# Patient Record
Sex: FEMALE | Race: OTHER | HISPANIC OR LATINO
[De-identification: names, ages, dates, MRNs, and addresses within clinical notes are randomized per-mention and may not be internally consistent; named-entity substitution may affect disease eponyms.]

---

## 2018-09-29 PROBLEM — Z00.00 ENCOUNTER FOR PREVENTIVE HEALTH EXAMINATION: Status: ACTIVE | Noted: 2018-09-29

## 2018-10-29 ENCOUNTER — APPOINTMENT (OUTPATIENT)
Dept: ENDOCRINOLOGY | Facility: CLINIC | Age: 48
End: 2018-10-29
Payer: MEDICAID

## 2018-10-29 VITALS
RESPIRATION RATE: 16 BRPM | HEART RATE: 70 BPM | HEIGHT: 63 IN | SYSTOLIC BLOOD PRESSURE: 115 MMHG | OXYGEN SATURATION: 98 % | DIASTOLIC BLOOD PRESSURE: 74 MMHG | WEIGHT: 138 LBS | BODY MASS INDEX: 24.45 KG/M2 | TEMPERATURE: 98.5 F

## 2018-10-29 DIAGNOSIS — Z86.39 PERSONAL HISTORY OF OTHER ENDOCRINE, NUTRITIONAL AND METABOLIC DISEASE: ICD-10-CM

## 2018-10-29 DIAGNOSIS — Z78.9 OTHER SPECIFIED HEALTH STATUS: ICD-10-CM

## 2018-10-29 DIAGNOSIS — Z87.39 PERSONAL HISTORY OF OTHER DISEASES OF THE MUSCULOSKELETAL SYSTEM AND CONNECTIVE TISSUE: ICD-10-CM

## 2018-10-29 DIAGNOSIS — Z87.898 PERSONAL HISTORY OF OTHER SPECIFIED CONDITIONS: ICD-10-CM

## 2018-10-29 PROCEDURE — 99204 OFFICE O/P NEW MOD 45 MIN: CPT

## 2019-04-03 ENCOUNTER — APPOINTMENT (OUTPATIENT)
Dept: ENDOCRINOLOGY | Facility: CLINIC | Age: 49
End: 2019-04-03
Payer: MEDICAID

## 2019-04-03 VITALS
WEIGHT: 140 LBS | HEART RATE: 83 BPM | OXYGEN SATURATION: 98 % | HEIGHT: 63 IN | SYSTOLIC BLOOD PRESSURE: 108 MMHG | BODY MASS INDEX: 24.8 KG/M2 | DIASTOLIC BLOOD PRESSURE: 69 MMHG | TEMPERATURE: 98.1 F | RESPIRATION RATE: 16 BRPM

## 2019-04-03 PROCEDURE — 99214 OFFICE O/P EST MOD 30 MIN: CPT

## 2019-04-03 NOTE — PHYSICAL EXAM
[Alert] : alert [No Acute Distress] : no acute distress [Normal Sclera/Conjunctiva] : normal sclera/conjunctiva [PERRL] : pupils equal, round and reactive to light [Normal Outer Ear/Nose] : the ears and nose were normal in appearance [Normal TMs] : both tympanic membranes were normal [No Neck Mass] : no neck mass was observed [Supple] : the neck was supple [No Respiratory Distress] : no respiratory distress [Normal Rate and Effort] : normal respiratory rhythm and effort [Normal PMI] : the apical impulse was normal [Normal Rate] : heart rate was normal  [Carotids Normal] : carotid pulses were normal with no bruits [Pedal Pulses Normal] : the pedal pulses are present [No Motor Deficits] : the motor exam was normal [No Sensory Deficits] : the sensory exam was normal to light touch and pinprick [Normal Sensation on Monofilament Testing] : normal sensation on monofilament testing of lower extremities

## 2019-04-03 NOTE — HISTORY OF PRESENT ILLNESS
[FreeTextEntry1] : Patient has a pituitary microadenoma since 2013, the lesion has not changed significantly for the past 2 years. The endocrinology work up was negative. Denies any headaches, her visual fields were fine. Denies galactorrhea. no change in the size of her hands or feet. She also sees the Neurologist Dr. Diana.

## 2019-04-03 NOTE — ASSESSMENT
[FreeTextEntry1] : The patient has an stable pituitary microadenoma\par The endocrine work up was negative\par Will continue observation\par Will repeat the endocrine work up in 4 months\par Will repeat the MRI of the pituitary gland at the end of the year

## 2019-07-21 ENCOUNTER — EMERGENCY (EMERGENCY)
Facility: HOSPITAL | Age: 49
LOS: 1 days | Discharge: HOME | End: 2019-07-21
Attending: EMERGENCY MEDICINE
Payer: MEDICAID

## 2019-07-21 VITALS
DIASTOLIC BLOOD PRESSURE: 70 MMHG | WEIGHT: 139.99 LBS | HEART RATE: 100 BPM | HEIGHT: 63 IN | SYSTOLIC BLOOD PRESSURE: 129 MMHG | TEMPERATURE: 98 F | OXYGEN SATURATION: 96 % | RESPIRATION RATE: 16 BRPM

## 2019-07-21 VITALS
OXYGEN SATURATION: 98 % | HEART RATE: 82 BPM | SYSTOLIC BLOOD PRESSURE: 123 MMHG | RESPIRATION RATE: 16 BRPM | TEMPERATURE: 98 F | DIASTOLIC BLOOD PRESSURE: 84 MMHG

## 2019-07-21 LAB
ALBUMIN SERPL ELPH-MCNC: 3.8 G/DL — SIGNIFICANT CHANGE UP (ref 3.5–5)
ALP SERPL-CCNC: 94 U/L — SIGNIFICANT CHANGE UP (ref 40–120)
ALT FLD-CCNC: 27 U/L DA — SIGNIFICANT CHANGE UP (ref 10–60)
ANION GAP SERPL CALC-SCNC: 8 MMOL/L — SIGNIFICANT CHANGE UP (ref 5–17)
AST SERPL-CCNC: 22 U/L — SIGNIFICANT CHANGE UP (ref 10–40)
BILIRUB SERPL-MCNC: 0.2 MG/DL — SIGNIFICANT CHANGE UP (ref 0.2–1.2)
BUN SERPL-MCNC: 10 MG/DL — SIGNIFICANT CHANGE UP (ref 7–18)
CALCIUM SERPL-MCNC: 8.4 MG/DL — SIGNIFICANT CHANGE UP (ref 8.4–10.5)
CHLORIDE SERPL-SCNC: 106 MMOL/L — SIGNIFICANT CHANGE UP (ref 96–108)
CK SERPL-CCNC: 156 U/L — SIGNIFICANT CHANGE UP (ref 21–215)
CO2 SERPL-SCNC: 26 MMOL/L — SIGNIFICANT CHANGE UP (ref 22–31)
CREAT SERPL-MCNC: 0.7 MG/DL — SIGNIFICANT CHANGE UP (ref 0.5–1.3)
GLUCOSE SERPL-MCNC: 144 MG/DL — HIGH (ref 70–99)
HCG SERPL-ACNC: 2 MIU/ML — SIGNIFICANT CHANGE UP
HCT VFR BLD CALC: 36.7 % — SIGNIFICANT CHANGE UP (ref 34.5–45)
HGB BLD-MCNC: 12.3 G/DL — SIGNIFICANT CHANGE UP (ref 11.5–15.5)
MCHC RBC-ENTMCNC: 31.6 PG — SIGNIFICANT CHANGE UP (ref 27–34)
MCHC RBC-ENTMCNC: 33.5 GM/DL — SIGNIFICANT CHANGE UP (ref 32–36)
MCV RBC AUTO: 94.3 FL — SIGNIFICANT CHANGE UP (ref 80–100)
NRBC # BLD: 0 /100 WBCS — SIGNIFICANT CHANGE UP (ref 0–0)
PLATELET # BLD AUTO: 267 K/UL — SIGNIFICANT CHANGE UP (ref 150–400)
POTASSIUM SERPL-MCNC: 3.7 MMOL/L — SIGNIFICANT CHANGE UP (ref 3.5–5.3)
POTASSIUM SERPL-SCNC: 3.7 MMOL/L — SIGNIFICANT CHANGE UP (ref 3.5–5.3)
PROT SERPL-MCNC: 8 G/DL — SIGNIFICANT CHANGE UP (ref 6–8.3)
RBC # BLD: 3.89 M/UL — SIGNIFICANT CHANGE UP (ref 3.8–5.2)
RBC # FLD: 12.8 % — SIGNIFICANT CHANGE UP (ref 10.3–14.5)
SODIUM SERPL-SCNC: 140 MMOL/L — SIGNIFICANT CHANGE UP (ref 135–145)
TROPONIN I SERPL-MCNC: <0.015 NG/ML — SIGNIFICANT CHANGE UP (ref 0–0.04)
TROPONIN I SERPL-MCNC: <0.015 NG/ML — SIGNIFICANT CHANGE UP (ref 0–0.04)
WBC # BLD: 7.43 K/UL — SIGNIFICANT CHANGE UP (ref 3.8–10.5)
WBC # FLD AUTO: 7.43 K/UL — SIGNIFICANT CHANGE UP (ref 3.8–10.5)

## 2019-07-21 PROCEDURE — 80053 COMPREHEN METABOLIC PANEL: CPT

## 2019-07-21 PROCEDURE — 85027 COMPLETE CBC AUTOMATED: CPT

## 2019-07-21 PROCEDURE — 71045 X-RAY EXAM CHEST 1 VIEW: CPT | Mod: 26

## 2019-07-21 PROCEDURE — 36415 COLL VENOUS BLD VENIPUNCTURE: CPT

## 2019-07-21 PROCEDURE — 99284 EMERGENCY DEPT VISIT MOD MDM: CPT | Mod: 25

## 2019-07-21 PROCEDURE — 99284 EMERGENCY DEPT VISIT MOD MDM: CPT

## 2019-07-21 PROCEDURE — 93005 ELECTROCARDIOGRAM TRACING: CPT

## 2019-07-21 PROCEDURE — 84484 ASSAY OF TROPONIN QUANT: CPT

## 2019-07-21 PROCEDURE — 82550 ASSAY OF CK (CPK): CPT

## 2019-07-21 PROCEDURE — 71045 X-RAY EXAM CHEST 1 VIEW: CPT

## 2019-07-21 PROCEDURE — 84702 CHORIONIC GONADOTROPIN TEST: CPT

## 2019-07-21 RX ORDER — SODIUM CHLORIDE 9 MG/ML
1000 INJECTION INTRAMUSCULAR; INTRAVENOUS; SUBCUTANEOUS ONCE
Refills: 0 | Status: COMPLETED | OUTPATIENT
Start: 2019-07-21 | End: 2019-07-21

## 2019-07-21 RX ADMIN — SODIUM CHLORIDE 1000 MILLILITER(S): 9 INJECTION INTRAMUSCULAR; INTRAVENOUS; SUBCUTANEOUS at 21:45

## 2019-07-22 NOTE — ED PROVIDER NOTE - PROGRESS NOTE DETAILS
Pt feels much improved.  Troponin negative x 2, EKG normal, no serious pathology suspected.  Advised strict return precautions and PMD f/u.

## 2019-07-22 NOTE — ED PROVIDER NOTE - OBJECTIVE STATEMENT
50 y/o female with no significant PMHx presents to the ED with c/o left sided chest discomfort and transient SOB since 6PM after being exposed to very hot weather. Pt denies any fever, cough, leg pain/swelling, nausea, vomiting, dizziness, syncope, Hx of DVT/PE, recent travels, or other complaints. Sx have now almost completely resolved.

## 2019-07-22 NOTE — ED PROVIDER NOTE - CLINICAL SUMMARY MEDICAL DECISION MAKING FREE TEXT BOX
50 y/o female presents with chest discomfort and mild SOB gradually improving after heat exposure. Will check labs, EKG, CXR, and reassess.

## 2019-07-24 ENCOUNTER — APPOINTMENT (OUTPATIENT)
Dept: CARDIOLOGY | Facility: CLINIC | Age: 49
End: 2019-07-24

## 2019-07-29 ENCOUNTER — APPOINTMENT (OUTPATIENT)
Dept: ENDOCRINOLOGY | Facility: CLINIC | Age: 49
End: 2019-07-29
Payer: MEDICAID

## 2019-07-29 VITALS
HEART RATE: 77 BPM | HEIGHT: 63 IN | DIASTOLIC BLOOD PRESSURE: 69 MMHG | RESPIRATION RATE: 16 BRPM | SYSTOLIC BLOOD PRESSURE: 117 MMHG | TEMPERATURE: 98.2 F | WEIGHT: 137 LBS | BODY MASS INDEX: 24.27 KG/M2 | OXYGEN SATURATION: 99 %

## 2019-07-29 PROCEDURE — 99214 OFFICE O/P EST MOD 30 MIN: CPT

## 2019-07-29 RX ORDER — DEXAMETHASONE 1 MG/1
1 TABLET ORAL
Qty: 1 | Refills: 1 | Status: COMPLETED | COMMUNITY
Start: 2019-04-03 | End: 2019-07-29

## 2019-07-29 NOTE — DATA REVIEWED
[FreeTextEntry1] : The overnight dexamethasone test was negative. The Prolactin was normal. The FBS was borderline elevated.

## 2019-07-29 NOTE — ASSESSMENT
[FreeTextEntry1] : Patient is clinically euthyroid\par The Overnight dexamethasone test was negative\par The MRI of the pituitary gland was done 11/19 and it was unchanged\par Will continue observation

## 2019-07-29 NOTE — HISTORY OF PRESENT ILLNESS
[FreeTextEntry1] : The patient is doing well, she has a solid/cystic lesion in the pituitary gland, unchanged during the past 4 years. The hormonal work up has been negative. The overnight dexamethasone test was negative. She is postmenopausal.

## 2019-07-29 NOTE — PHYSICAL EXAM
[Alert] : alert [No Acute Distress] : no acute distress [Normal Sclera/Conjunctiva] : normal sclera/conjunctiva [PERRL] : pupils equal, round and reactive to light [Normal Outer Ear/Nose] : the ears and nose were normal in appearance [Normal TMs] : both tympanic membranes were normal [No Respiratory Distress] : no respiratory distress [Supple] : the neck was supple [No Neck Mass] : no neck mass was observed [Normal Rate and Effort] : normal respiratory rhythm and effort [Normal PMI] : the apical impulse was normal [Normal Rate] : heart rate was normal  [Carotids Normal] : carotid pulses were normal with no bruits [No Motor Deficits] : the motor exam was normal [Pedal Pulses Normal] : the pedal pulses are present [No Sensory Deficits] : the sensory exam was normal to light touch and pinprick [Normal Sensation on Monofilament Testing] : normal sensation on monofilament testing of lower extremities

## 2019-07-30 PROBLEM — Z78.9 OTHER SPECIFIED HEALTH STATUS: Chronic | Status: ACTIVE | Noted: 2019-07-24

## 2019-11-18 ENCOUNTER — APPOINTMENT (OUTPATIENT)
Dept: ENDOCRINOLOGY | Facility: CLINIC | Age: 49
End: 2019-11-18
Payer: MEDICAID

## 2019-11-18 VITALS
WEIGHT: 132 LBS | OXYGEN SATURATION: 99 % | DIASTOLIC BLOOD PRESSURE: 79 MMHG | RESPIRATION RATE: 16 BRPM | HEART RATE: 84 BPM | TEMPERATURE: 98.4 F | SYSTOLIC BLOOD PRESSURE: 128 MMHG | BODY MASS INDEX: 23.39 KG/M2 | HEIGHT: 63 IN

## 2019-11-18 PROCEDURE — 99214 OFFICE O/P EST MOD 30 MIN: CPT

## 2019-11-18 NOTE — HISTORY OF PRESENT ILLNESS
[FreeTextEntry1] : The patient has a 9 mm pituitary microadenoma, the endocrine work up was fine. The FBS was normal with borderline elevated HbA1c, She saw the neurologist, she is ordering a new MRI of the brain.

## 2019-11-18 NOTE — DATA REVIEWED
[FreeTextEntry1] : The TSH and Free t4 were normal. The Prolactin was normal. The FBS was fine with borderline elevated  HbA1c

## 2019-11-18 NOTE — PHYSICAL EXAM
[Alert] : alert [No Acute Distress] : no acute distress [Normal Sclera/Conjunctiva] : normal sclera/conjunctiva [PERRL] : pupils equal, round and reactive to light [Normal TMs] : both tympanic membranes were normal [Normal Outer Ear/Nose] : the ears and nose were normal in appearance [No Neck Mass] : no neck mass was observed [No Respiratory Distress] : no respiratory distress [Supple] : the neck was supple [Normal Rate] : heart rate was normal  [Normal Rate and Effort] : normal respiratory rhythm and effort [Normal PMI] : the apical impulse was normal [Pedal Pulses Normal] : the pedal pulses are present [Carotids Normal] : carotid pulses were normal with no bruits [No Motor Deficits] : the motor exam was normal [No Sensory Deficits] : the sensory exam was normal to light touch and pinprick [Normal Sensation on Monofilament Testing] : normal sensation on monofilament testing of lower extremities

## 2019-11-18 NOTE — ASSESSMENT
[FreeTextEntry1] : The endocrine work up is normal\par The prediabetes is stable\par Will repeat the MRI of the brain\par She is on Vitamin D3\par Patient has Osteopenia on Alendronate, she does not remember well how many years she is taking the medication but it is about 3 to 4 years\par She will continue the medication until next year.

## 2020-04-22 ENCOUNTER — APPOINTMENT (OUTPATIENT)
Dept: CARDIOLOGY | Facility: CLINIC | Age: 50
End: 2020-04-22

## 2020-05-21 RX ORDER — GABAPENTIN 100 MG/1
100 CAPSULE ORAL
Refills: 0 | Status: ACTIVE | COMMUNITY

## 2020-05-21 RX ORDER — GLUCOSAMINE SULFATE DIPOT CHLR 1000 MG
TABLET ORAL
Refills: 0 | Status: ACTIVE | COMMUNITY

## 2020-05-21 RX ORDER — UBIDECARENONE/VIT E ACET 100MG-5
CAPSULE ORAL
Refills: 0 | Status: ACTIVE | COMMUNITY

## 2020-05-21 RX ORDER — TERBINAFINE HYDROCHLORIDE 250 MG/1
250 TABLET ORAL
Refills: 0 | Status: ACTIVE | COMMUNITY

## 2020-06-03 ENCOUNTER — APPOINTMENT (OUTPATIENT)
Dept: CARDIOLOGY | Facility: CLINIC | Age: 50
End: 2020-06-03
Payer: MEDICAID

## 2020-06-03 VITALS
DIASTOLIC BLOOD PRESSURE: 77 MMHG | OXYGEN SATURATION: 98 % | SYSTOLIC BLOOD PRESSURE: 116 MMHG | BODY MASS INDEX: 21.62 KG/M2 | HEART RATE: 79 BPM | WEIGHT: 122 LBS | HEIGHT: 63 IN

## 2020-06-03 DIAGNOSIS — R07.89 OTHER CHEST PAIN: ICD-10-CM

## 2020-06-03 PROCEDURE — 99204 OFFICE O/P NEW MOD 45 MIN: CPT

## 2020-06-03 PROCEDURE — 93000 ELECTROCARDIOGRAM COMPLETE: CPT

## 2020-06-10 ENCOUNTER — APPOINTMENT (OUTPATIENT)
Dept: NEUROLOGY | Facility: CLINIC | Age: 50
End: 2020-06-10

## 2020-06-10 ENCOUNTER — APPOINTMENT (OUTPATIENT)
Dept: ENDOCRINOLOGY | Facility: CLINIC | Age: 50
End: 2020-06-10
Payer: MEDICAID

## 2020-06-10 ENCOUNTER — APPOINTMENT (OUTPATIENT)
Dept: ENDOCRINOLOGY | Facility: CLINIC | Age: 50
End: 2020-06-10

## 2020-06-10 PROCEDURE — 99443: CPT

## 2020-06-10 NOTE — REASON FOR VISIT
[Follow - Up] : a follow-up visit [Pituitary Evaluation/ Disorder] : pituitary evaluation/disorder [Other___] : [unfilled]

## 2020-06-10 NOTE — HISTORY OF PRESENT ILLNESS
[Home] : at home, [unfilled] , at the time of the visit. [Medical Office: ([facility])___] : at the medical office located in  [Verbal consent obtained from patient] : the patient, [unfilled] [FreeTextEntry1] : Patient is not feeling well, she is feeling very nervous, anxious. She has lost weight. She has been home for 3 months. She is starting to go out. Her blood work up revealed elevated FBS and LDL-C. her TSH, Prolactin and IGF1 were normal. He has an appointment with her PCP, she wants to see a Psychologist. The MRI of the brain revealed smaller pituitary lesion.

## 2020-06-10 NOTE — DATA REVIEWED
[FreeTextEntry1] : The FBS was elevated, the LDL-C was elevated. The prolactin, IGF-1 and TSH were normal.

## 2020-06-10 NOTE — ASSESSMENT
[FreeTextEntry1] : The patient has Prediabetes\par She is clinically euthyroid\par The pituitary microadenoma is non functional\par Her hyperlipidemia is not well controlled\par Mailed a low CHO, low fat diet\par Will repeat the blood tests in 4 months

## 2020-08-11 ENCOUNTER — OUTPATIENT (OUTPATIENT)
Dept: OUTPATIENT SERVICES | Facility: HOSPITAL | Age: 50
LOS: 1 days | End: 2020-08-11
Payer: MEDICAID

## 2020-08-11 DIAGNOSIS — R07.81 PLEURODYNIA: ICD-10-CM

## 2020-08-11 PROCEDURE — 93350 STRESS TTE ONLY: CPT | Mod: 26

## 2020-08-11 PROCEDURE — 93325 DOPPLER ECHO COLOR FLOW MAPG: CPT

## 2020-08-11 PROCEDURE — 93320 DOPPLER ECHO COMPLETE: CPT

## 2020-08-25 NOTE — ADDENDUM
[FreeTextEntry1] : ADDENDUM 8/25: Stress echo from 08/11/2020 was negative for ischemia (11 METS achieved). D/W patient upon completion of study.

## 2020-08-25 NOTE — HISTORY OF PRESENT ILLNESS
[FreeTextEntry1] : 50-year-old female with hyperlipidemia and pituitary microadenoma who presents for evaluation of chest pain. Patient reports the chest pain is left-sided, pressure-like in nature, non-radiating, lasting for up to an hour at a time. Symptoms are associated with occasional dyspnea, lightheadedness or palpitations. No syncope. Symptoms occur every 2-3 days on average, are not always linked with activity. Patient has not had any symptoms for the past week. Denies lower extremity edema, orthopnea, or PND.

## 2020-08-25 NOTE — ASSESSMENT
[FreeTextEntry1] : 50-year-old female with hyperlipidemia and pituitary microadenoma who presents for evaluation of chest pain. \par \par 1. Chest pain: While patient's presentation has some typical symptoms, other aspects seem atypical and the patient has no traditional risk factors including smoking, diabetes, HTN, or family history of CAD.\par -Given her younger age and desire to minimize radiation exposure, would send patient for a stress echocardiogram for further ischemic evaluation\par \par 2.HLD: On atorvastatin, LDL is suboptimal. If repeat blood work is not significantly changed, would recommend increasing dose of atorvastatin

## 2020-09-02 ENCOUNTER — APPOINTMENT (OUTPATIENT)
Dept: CARDIOLOGY | Facility: CLINIC | Age: 50
End: 2020-09-02

## 2020-10-12 ENCOUNTER — APPOINTMENT (OUTPATIENT)
Dept: ENDOCRINOLOGY | Facility: CLINIC | Age: 50
End: 2020-10-12
Payer: MEDICAID

## 2020-10-12 VITALS
DIASTOLIC BLOOD PRESSURE: 70 MMHG | BODY MASS INDEX: 21.79 KG/M2 | OXYGEN SATURATION: 100 % | WEIGHT: 123 LBS | HEIGHT: 63 IN | RESPIRATION RATE: 16 BRPM | HEART RATE: 80 BPM | TEMPERATURE: 97.3 F | SYSTOLIC BLOOD PRESSURE: 103 MMHG

## 2020-10-12 PROCEDURE — 99214 OFFICE O/P EST MOD 30 MIN: CPT

## 2020-10-12 NOTE — HISTORY OF PRESENT ILLNESS
[FreeTextEntry1] : The patient is doing well, denies any headaches or dizziness or eye problems. She has Osteopenia, she used to take Alendronate for 5 years. The MRI of the brain 5/20 was stable in size.

## 2020-10-12 NOTE — ASSESSMENT
[FreeTextEntry1] : The pituitary adenoma is stable\par The hormonal study was fine\par The LDL-C is elevated\par Advised to take the Simvastatin regularly.

## 2020-10-12 NOTE — DATA REVIEWED
[FreeTextEntry1] : The blood work up was negative except for the LDL-C that is elevated. She has not been taking the statin. The Prolactin was normal with elevated gonadotropins.

## 2021-03-24 ENCOUNTER — APPOINTMENT (OUTPATIENT)
Dept: ENDOCRINOLOGY | Facility: CLINIC | Age: 51
End: 2021-03-24
Payer: MEDICAID

## 2021-03-24 VITALS
TEMPERATURE: 98 F | DIASTOLIC BLOOD PRESSURE: 72 MMHG | SYSTOLIC BLOOD PRESSURE: 108 MMHG | WEIGHT: 134 LBS | BODY MASS INDEX: 23.74 KG/M2 | HEIGHT: 63 IN | OXYGEN SATURATION: 99 % | HEART RATE: 82 BPM | RESPIRATION RATE: 16 BRPM

## 2021-03-24 DIAGNOSIS — M85.80 OTHER SPECIFIED DISORDERS OF BONE DENSITY AND STRUCTURE, UNSPECIFIED SITE: ICD-10-CM

## 2021-03-24 PROCEDURE — 99072 ADDL SUPL MATRL&STAF TM PHE: CPT

## 2021-03-24 PROCEDURE — 99214 OFFICE O/P EST MOD 30 MIN: CPT

## 2021-03-24 NOTE — ASSESSMENT
[FreeTextEntry1] : Patient feels well but she has gained weight\par She is now slightly hypothyroid\par Will repeat the thyroid tests before starting treatment\par Patient will call me back for results\par Will repeat the MRI of the pituitary gland on 5/21\par Will order an US thyroid

## 2021-03-24 NOTE — HISTORY OF PRESENT ILLNESS
[FreeTextEntry1] : Patient feels well, but she has not been following the diet and gaining weight. Her TSH is now slightly elevated. She denies cold intolerance or dryness of the skin. The Prolactin and IGF-1 were normal.

## 2021-03-24 NOTE — REASON FOR VISIT
[Follow - Up] : a follow-up visit [Hypothyroidism] : hypothyroidism [Pituitary Evaluation/ Disorder] : pituitary evaluation/disorder [Other___] : [unfilled]

## 2021-05-01 ENCOUNTER — NON-APPOINTMENT (OUTPATIENT)
Age: 51
End: 2021-05-01

## 2021-05-06 ENCOUNTER — NON-APPOINTMENT (OUTPATIENT)
Age: 51
End: 2021-05-06

## 2021-07-16 ENCOUNTER — APPOINTMENT (OUTPATIENT)
Dept: ENDOCRINOLOGY | Facility: CLINIC | Age: 51
End: 2021-07-16

## 2021-10-21 ENCOUNTER — APPOINTMENT (OUTPATIENT)
Dept: ENDOCRINOLOGY | Facility: CLINIC | Age: 51
End: 2021-10-21
Payer: MEDICAID

## 2021-10-21 PROCEDURE — 99443: CPT

## 2021-10-21 NOTE — HISTORY OF PRESENT ILLNESS
[Home] : at home, [unfilled] , at the time of the visit. [Medical Office: ([facility])___] : at the medical office located in  [Verbal consent obtained from patient] : the patient, [unfilled] [FreeTextEntry1] : Patient is doing well, denies feeling tired, having cold intolerance, or palpitations. Denies dryness of the skin or hair loss. She denies chest pain or SOB. Taking the Levothyroxine regularly ½ hour before breakfast. Her weight has increased.  The thyroid tests are within normal limits. The US thyroid was unchanged 5/21.\par

## 2021-10-21 NOTE — ASSESSMENT
[FreeTextEntry1] : Patient is clinically euthyroid\par Her weight has increased\par Will continue the same thyroid medication\par Will repeat the thyroid function tests before next visit\par The neurologist will order the MRI of the brain\par The Prolactin level is normal.\par The Prediabetes has deteriorated\par Advised to follow the diet and exercise\par The medications were renewed

## 2021-10-21 NOTE — DATA REVIEWED
[FreeTextEntry1] : The TSH and Free t4 are normal. The HbA1c has increased. The LDL-c has increased. Her Prolactin level and IGF-1 were normal.

## 2022-03-29 ENCOUNTER — APPOINTMENT (OUTPATIENT)
Dept: ENDOCRINOLOGY | Facility: CLINIC | Age: 52
End: 2022-03-29
Payer: MEDICAID

## 2022-03-29 VITALS
HEART RATE: 72 BPM | TEMPERATURE: 98.3 F | BODY MASS INDEX: 26.05 KG/M2 | HEIGHT: 63 IN | SYSTOLIC BLOOD PRESSURE: 113 MMHG | OXYGEN SATURATION: 97 % | DIASTOLIC BLOOD PRESSURE: 75 MMHG | WEIGHT: 147 LBS | RESPIRATION RATE: 16 BRPM

## 2022-03-29 PROCEDURE — 99214 OFFICE O/P EST MOD 30 MIN: CPT

## 2022-03-29 NOTE — ASSESSMENT
[FreeTextEntry1] : Patient is clinically euthyroid\par The endocrine work up for the pituitary gland was fine\par Will continue same treatment\par Advised to see Neurologist\par Advised to get an MRI of the pituitary gland with contrast\par The Prediabetes has deteriorated slightly as well as the hyperlipidemia\par Given a low CHO, low fat diet\par Advised to exercise regularly

## 2022-03-29 NOTE — DATA REVIEWED
[FreeTextEntry1] : The TSH and free t4 are normal. The FBS and hbA1c are slightly higher. The prolactin and IGF-1 are normal. The FSH and LH are elevated.

## 2022-03-29 NOTE — HISTORY OF PRESENT ILLNESS
[FreeTextEntry1] : Patient is doing well, denies feeling tired, having cold intolerance, or palpitations. Denies dryness of the skin or hair loss. She denies chest pain or SOB. Taking the Levothyroxine regularly ½ hour before breakfast. Her weight has increased. The thyroid tests are within normal limits. The US thyroid is unchanged. \par

## 2022-05-10 ENCOUNTER — APPOINTMENT (OUTPATIENT)
Dept: CARDIOLOGY | Facility: CLINIC | Age: 52
End: 2022-05-10
Payer: MEDICAID

## 2022-05-10 ENCOUNTER — NON-APPOINTMENT (OUTPATIENT)
Age: 52
End: 2022-05-10

## 2022-05-10 VITALS
HEART RATE: 87 BPM | BODY MASS INDEX: 24.8 KG/M2 | SYSTOLIC BLOOD PRESSURE: 114 MMHG | DIASTOLIC BLOOD PRESSURE: 72 MMHG | TEMPERATURE: 98 F | WEIGHT: 140 LBS | HEIGHT: 63 IN | OXYGEN SATURATION: 100 %

## 2022-05-10 DIAGNOSIS — E78.5 HYPERLIPIDEMIA, UNSPECIFIED: ICD-10-CM

## 2022-05-10 PROCEDURE — 93000 ELECTROCARDIOGRAM COMPLETE: CPT

## 2022-05-10 PROCEDURE — 99214 OFFICE O/P EST MOD 30 MIN: CPT | Mod: 25

## 2022-05-10 NOTE — HISTORY OF PRESENT ILLNESS
[FreeTextEntry1] : 52-year-old female with hyperlipidemia and pituitary microadenoma who presents for follow-up visit.  Patient had a nuclear stress test in 08/2020 showing no ischemia after 11 METS, echocardiogram showed preserved EF.\par \par Patient was last seen in 2020.  Since that time, she reports that she has been doing fairly well with some palpitations that started about 3 weeks ago, lasting a minute at a time, not associated with any chest pain, dyspnea, lightheadedness, or syncope.  Patient reports that the symptoms have nearly resolved at this point. Denies LE edema, orthopnea, or PND. Patient reports compliance with all medications, denies adverse effects.\par \par \par

## 2022-05-10 NOTE — ASSESSMENT
[FreeTextEntry1] : 52-year-old female with hyperlipidemia and unremarkable nuclear stress/echo in 2020 who presents for FUV. \par \par 1. Palpitations: Symptoms appear mild, and are not significant enough that patient will be interested in starting medication or performing any procedure.  If symptoms worsen, she will let me know and we can perform further work-up as needed.\par \par 2.HLD: On atorvastatin, LDL is adequate\par -Patient was counseled on lifestyle modifications including recommendations for diet and exercise\par

## 2022-08-18 ENCOUNTER — APPOINTMENT (OUTPATIENT)
Dept: ENDOCRINOLOGY | Facility: CLINIC | Age: 52
End: 2022-08-18

## 2022-08-18 VITALS
TEMPERATURE: 97.7 F | HEIGHT: 63 IN | BODY MASS INDEX: 25.16 KG/M2 | HEART RATE: 72 BPM | WEIGHT: 142 LBS | SYSTOLIC BLOOD PRESSURE: 131 MMHG | RESPIRATION RATE: 16 BRPM | DIASTOLIC BLOOD PRESSURE: 81 MMHG | OXYGEN SATURATION: 97 %

## 2022-08-18 PROCEDURE — 99214 OFFICE O/P EST MOD 30 MIN: CPT

## 2022-08-18 RX ORDER — OMEGA-3/DHA/EPA/FISH OIL 300-1000MG
1000 CAPSULE ORAL
Refills: 0 | Status: ACTIVE | COMMUNITY

## 2022-08-18 NOTE — ASSESSMENT
[FreeTextEntry1] : Patient feels very nervous anxious\par She had COVID recently\par The prediabetes is stable\par The MRI of the brain indicates small hemorrhage and a cyst\par She has osteopenia.\par She is clinically euthyroid\par Will continue the same Levothyroxine dose\par The Atorvastatin was renewed\par She stopped the Alendronate 4 years ago\par Advised to take Calcium plus Vitamin D

## 2022-08-18 NOTE — DATA REVIEWED
[FreeTextEntry1] : The FBS was fine but the hbA1c was 6%. Her LDL-C remains elevated. Her TSH and Free t4 were normal. Her prolactin was normal. 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 Her LDL-C is elevated.

## 2022-12-01 ENCOUNTER — APPOINTMENT (OUTPATIENT)
Dept: ENDOCRINOLOGY | Facility: CLINIC | Age: 52
End: 2022-12-01

## 2022-12-01 VITALS
DIASTOLIC BLOOD PRESSURE: 81 MMHG | WEIGHT: 143 LBS | HEART RATE: 77 BPM | RESPIRATION RATE: 16 BRPM | TEMPERATURE: 98.3 F | SYSTOLIC BLOOD PRESSURE: 117 MMHG | BODY MASS INDEX: 25.34 KG/M2 | OXYGEN SATURATION: 97 % | HEIGHT: 63 IN

## 2022-12-01 PROCEDURE — 99214 OFFICE O/P EST MOD 30 MIN: CPT

## 2022-12-01 NOTE — HISTORY OF PRESENT ILLNESS
[FreeTextEntry1] : Patient is doing well, denies feeling tired, having cold intolerance, or palpitations. Denies dryness of the skin or hair loss. She denies chest pain or SOB. Taking the Levothyroxine regularly ½ hour before breakfast. Her weight has not changed.  The thyroid tests are within normal limits. The US thyroid is unchanged. The MRI of the brain on 4/22 disclosed a possible Rathke cyst instead of a microadenoma. She had an early menopause.\par

## 2022-12-01 NOTE — DATA REVIEWED
[FreeTextEntry1] : Her TSH and free t4 were normal. The HbA1c have improved. the LDL-C still elevated, she stopped the Atorvastatin.

## 2022-12-01 NOTE — ASSESSMENT
[FreeTextEntry1] : The patient is clinically euthyroid\par The MRI of the pituitary gland was stable\par The Prediabetes has improved\par Will continue the same treatment

## 2023-04-06 ENCOUNTER — APPOINTMENT (OUTPATIENT)
Dept: ENDOCRINOLOGY | Facility: CLINIC | Age: 53
End: 2023-04-06
Payer: MEDICAID

## 2023-04-06 VITALS
HEART RATE: 74 BPM | DIASTOLIC BLOOD PRESSURE: 82 MMHG | BODY MASS INDEX: 25.69 KG/M2 | HEIGHT: 63 IN | TEMPERATURE: 98.1 F | RESPIRATION RATE: 16 BRPM | SYSTOLIC BLOOD PRESSURE: 120 MMHG | OXYGEN SATURATION: 99 % | WEIGHT: 145 LBS

## 2023-04-06 PROCEDURE — 99214 OFFICE O/P EST MOD 30 MIN: CPT

## 2023-04-06 NOTE — ASSESSMENT
[FreeTextEntry1] : Patient is clinically euthyroid\par Her weight has increased\par Will continue the same thyroid medication\par Will order a new US thyroid before \par Will repeat the thyroid function tests before next visit\par The empty sella syndrome is stable

## 2023-04-06 NOTE — HISTORY OF PRESENT ILLNESS
[FreeTextEntry1] : Patient is doing well, asymptomatic. She has an empty sell syndrome to several years. She is followed by the Neurologist. The endocrine work up has been negative

## 2023-04-06 NOTE — DATA REVIEWED
[FreeTextEntry1] : The TSH, Free T4 and prolactin were normal. The FSH, LH and Estradiol were also normal. The IGF-1 was fine.

## 2023-07-03 RX ORDER — CHLORHEXIDINE GLUCONATE 4 %
600-20 LIQUID (ML) TOPICAL
Qty: 180 | Refills: 3 | Status: ACTIVE | COMMUNITY
Start: 2022-08-18 | End: 1900-01-01

## 2023-08-05 ENCOUNTER — NON-APPOINTMENT (OUTPATIENT)
Age: 53
End: 2023-08-05

## 2023-11-02 NOTE — ED ADULT NURSE NOTE - NSFALLRSKUNASSIST_ED_ALL_ED
Quality 226: Preventive Care And Screening: Tobacco Use: Screening And Cessation Intervention: Tobacco Screening not Performed
Detail Level: Detailed
Quality 130: Documentation Of Current Medications In The Medical Record: Current Medications Documented
Quality 431: Preventive Care And Screening: Unhealthy Alcohol Use - Screening: Patient not identified as an unhealthy alcohol user when screened for unhealthy alcohol use using a systematic screening method
no

## 2023-11-07 ENCOUNTER — APPOINTMENT (OUTPATIENT)
Dept: ENDOCRINOLOGY | Facility: CLINIC | Age: 53
End: 2023-11-07
Payer: MEDICAID

## 2023-11-07 VITALS
SYSTOLIC BLOOD PRESSURE: 121 MMHG | HEART RATE: 85 BPM | BODY MASS INDEX: 26.58 KG/M2 | DIASTOLIC BLOOD PRESSURE: 82 MMHG | HEIGHT: 63 IN | OXYGEN SATURATION: 98 % | RESPIRATION RATE: 16 BRPM | WEIGHT: 150 LBS | TEMPERATURE: 97.3 F

## 2023-11-07 PROCEDURE — 99214 OFFICE O/P EST MOD 30 MIN: CPT

## 2023-11-07 RX ORDER — DEXAMETHASONE 1 MG/1
1 TABLET ORAL
Qty: 1 | Refills: 1 | Status: ACTIVE | COMMUNITY
Start: 2023-11-07 | End: 1900-01-01

## 2023-11-07 RX ORDER — ALENDRONATE SODIUM 70 MG/1
70 TABLET ORAL
Qty: 12 | Refills: 3 | Status: ACTIVE | COMMUNITY
Start: 2023-07-03 | End: 1900-01-01

## 2024-02-22 ENCOUNTER — APPOINTMENT (OUTPATIENT)
Dept: ENDOCRINOLOGY | Facility: CLINIC | Age: 54
End: 2024-02-22
Payer: MEDICAID

## 2024-02-22 VITALS
OXYGEN SATURATION: 98 % | BODY MASS INDEX: 27.11 KG/M2 | WEIGHT: 153 LBS | HEIGHT: 63 IN | HEART RATE: 88 BPM | SYSTOLIC BLOOD PRESSURE: 111 MMHG | TEMPERATURE: 97.8 F | DIASTOLIC BLOOD PRESSURE: 58 MMHG | RESPIRATION RATE: 16 BRPM

## 2024-02-22 DIAGNOSIS — M81.0 AGE-RELATED OSTEOPOROSIS W/OUT CURRENT PATHOLOGICAL FRACTURE: ICD-10-CM

## 2024-02-22 PROCEDURE — 99214 OFFICE O/P EST MOD 30 MIN: CPT | Mod: 25

## 2024-02-22 NOTE — ASSESSMENT
[FreeTextEntry1] : Patient is clinically euthyroid Her weight has increased Will continue the same thyroid medication The FBS and HbA1c have increased Given a low CHO low fat diet Will repeat the thyroid function tests before next visit The Overnight Dexamethasone Suppression test was negative The Prolactin was normal.  She is on Alendronate plus Calcium with Vitamin D for Osteoporosis She has an appointment with the Neurologist for the non functional pituitary microadenoma

## 2024-02-22 NOTE — REASON FOR VISIT
[Follow - Up] : a follow-up visit [Pituitary Evaluation/ Disorder] : pituitary evaluation/disorder [Hypothyroidism] : hypothyroidism [Osteoporosis] : osteoporosis [Other___] : [unfilled]

## 2024-02-22 NOTE — DATA REVIEWED
[FreeTextEntry1] : The TSH and free t4 were normal. The LDL-C has increased. Her calcium is normal. The FBS and HbA1c have increased.

## 2024-02-22 NOTE — HISTORY OF PRESENT ILLNESS
[FreeTextEntry1] : Patient is doing well, denies feeling tired, having cold intolerance, or palpitations. Denies dryness of the skin or hair loss. She denies chest pain or SOB. Taking the Levothyroxine regularly 1/2 hour before breakfast. Her weight has not changed. Denies any headaches dizziness or eye problems. Her father passed away and she had to travel to [place]. She has not been following the diet. She has gained weight,

## 2024-02-28 ENCOUNTER — RX RENEWAL (OUTPATIENT)
Age: 54
End: 2024-02-28

## 2024-02-28 RX ORDER — ATORVASTATIN CALCIUM 10 MG/1
10 TABLET, FILM COATED ORAL
Qty: 30 | Refills: 11 | Status: ACTIVE | COMMUNITY
Start: 2020-04-01 | End: 1900-01-01

## 2024-06-20 ENCOUNTER — APPOINTMENT (OUTPATIENT)
Dept: ENDOCRINOLOGY | Facility: CLINIC | Age: 54
End: 2024-06-20
Payer: MEDICAID

## 2024-06-20 VITALS
OXYGEN SATURATION: 98 % | WEIGHT: 147 LBS | RESPIRATION RATE: 16 BRPM | TEMPERATURE: 98 F | HEIGHT: 63 IN | SYSTOLIC BLOOD PRESSURE: 109 MMHG | DIASTOLIC BLOOD PRESSURE: 72 MMHG | BODY MASS INDEX: 26.05 KG/M2 | HEART RATE: 73 BPM

## 2024-06-20 DIAGNOSIS — E03.9 HYPOTHYROIDISM, UNSPECIFIED: ICD-10-CM

## 2024-06-20 DIAGNOSIS — D35.2 BENIGN NEOPLASM OF PITUITARY GLAND: ICD-10-CM

## 2024-06-20 DIAGNOSIS — E23.6 OTHER DISORDERS OF PITUITARY GLAND: ICD-10-CM

## 2024-06-20 DIAGNOSIS — R73.03 PREDIABETES.: ICD-10-CM

## 2024-06-20 PROCEDURE — 99214 OFFICE O/P EST MOD 30 MIN: CPT

## 2024-06-20 PROCEDURE — G2211 COMPLEX E/M VISIT ADD ON: CPT | Mod: NC

## 2024-06-20 RX ORDER — MULTIVIT-MIN/FOLIC/VIT K/LYCOP 400-300MCG
50 MCG TABLET ORAL
Qty: 90 | Refills: 3 | Status: ACTIVE | COMMUNITY
Start: 2023-11-07 | End: 1900-01-01

## 2024-06-20 RX ORDER — LEVOTHYROXINE SODIUM 0.03 MG/1
25 TABLET ORAL
Qty: 90 | Refills: 3 | Status: ACTIVE | COMMUNITY
Start: 2021-05-03 | End: 1900-01-01

## 2024-06-20 NOTE — DATA REVIEWED
[FreeTextEntry1] : The TSH and Free t4 are normal. The HbA1c has improved. her FBS was normal. The Prolactin and IGF-1 were normal. The last MRI of the brain was 4/22 it disclosed a Rathke's cyst

## 2024-06-20 NOTE — ASSESSMENT
[FreeTextEntry1] : Patient is clinically euthyroid Her weight has decreased Will continue the same thyroid medication Will order a new US thyroid before next visit Will repeat the thyroid function tests before next visit Advised to take the Levothyroxine alone 1/2 hour before breakfast Advised to follow the diet and exercise to control the Prediabetes The Rathke's cyst is under observation by her Neurologist The pituitary hormones were fine.

## 2024-06-20 NOTE — HISTORY OF PRESENT ILLNESS
[FreeTextEntry1] : Patient is doing well, denies feeling tired, having cold intolerance, or palpitations. Denies dryness of the skin or hair loss. She denies chest pain or SOB. Taking the Levothyroxine regularly 1/2 hour before breakfast. She has lost weight. She is taking the Alendronate regularly for Osteoporosis. She is due for a DEXA scan next year.

## 2024-06-20 NOTE — REASON FOR VISIT
[Follow - Up] : a follow-up visit [Hypothyroidism] : hypothyroidism [Pituitary Evaluation/ Disorder] : pituitary evaluation/disorder [Osteoporosis] : osteoporosis [Other___] : [unfilled]

## 2024-09-14 ENCOUNTER — RX RENEWAL (OUTPATIENT)
Age: 54
End: 2024-09-14

## 2024-09-14 RX ORDER — MELATONIN 10 MG
600-20 TABLET, SUBLINGUAL SUBLINGUAL
Qty: 180 | Refills: 3 | Status: ACTIVE | COMMUNITY
Start: 2024-09-14 | End: 1900-01-01

## 2024-10-17 ENCOUNTER — APPOINTMENT (OUTPATIENT)
Dept: ENDOCRINOLOGY | Facility: CLINIC | Age: 54
End: 2024-10-17
Payer: MEDICAID

## 2024-10-17 VITALS
HEART RATE: 83 BPM | BODY MASS INDEX: 25.69 KG/M2 | OXYGEN SATURATION: 98 % | TEMPERATURE: 96.5 F | RESPIRATION RATE: 16 BRPM | WEIGHT: 145 LBS | SYSTOLIC BLOOD PRESSURE: 129 MMHG | DIASTOLIC BLOOD PRESSURE: 81 MMHG | HEIGHT: 63 IN

## 2024-10-17 DIAGNOSIS — E03.9 HYPOTHYROIDISM, UNSPECIFIED: ICD-10-CM

## 2024-10-17 DIAGNOSIS — M81.0 AGE-RELATED OSTEOPOROSIS W/OUT CURRENT PATHOLOGICAL FRACTURE: ICD-10-CM

## 2024-10-17 DIAGNOSIS — R73.03 PREDIABETES.: ICD-10-CM

## 2024-10-17 PROCEDURE — 99214 OFFICE O/P EST MOD 30 MIN: CPT

## 2024-10-17 PROCEDURE — G2211 COMPLEX E/M VISIT ADD ON: CPT | Mod: NC

## 2025-01-20 ENCOUNTER — NON-APPOINTMENT (OUTPATIENT)
Age: 55
End: 2025-01-20

## 2025-03-04 ENCOUNTER — RX RENEWAL (OUTPATIENT)
Age: 55
End: 2025-03-04

## 2025-04-17 ENCOUNTER — APPOINTMENT (OUTPATIENT)
Dept: ENDOCRINOLOGY | Facility: CLINIC | Age: 55
End: 2025-04-17
Payer: COMMERCIAL

## 2025-04-17 VITALS
RESPIRATION RATE: 16 BRPM | DIASTOLIC BLOOD PRESSURE: 77 MMHG | SYSTOLIC BLOOD PRESSURE: 121 MMHG | BODY MASS INDEX: 25.34 KG/M2 | OXYGEN SATURATION: 97 % | WEIGHT: 143 LBS | TEMPERATURE: 97.3 F | HEIGHT: 63 IN | HEART RATE: 83 BPM

## 2025-04-17 DIAGNOSIS — M81.0 AGE-RELATED OSTEOPOROSIS W/OUT CURRENT PATHOLOGICAL FRACTURE: ICD-10-CM

## 2025-04-17 DIAGNOSIS — D35.2 BENIGN NEOPLASM OF PITUITARY GLAND: ICD-10-CM

## 2025-04-17 DIAGNOSIS — R73.03 PREDIABETES.: ICD-10-CM

## 2025-04-17 DIAGNOSIS — E03.9 HYPOTHYROIDISM, UNSPECIFIED: ICD-10-CM

## 2025-04-17 PROCEDURE — G2211 COMPLEX E/M VISIT ADD ON: CPT | Mod: NC

## 2025-04-17 PROCEDURE — 99214 OFFICE O/P EST MOD 30 MIN: CPT

## 2025-06-19 ENCOUNTER — RX RENEWAL (OUTPATIENT)
Age: 55
End: 2025-06-19

## 2025-07-03 ENCOUNTER — NON-APPOINTMENT (OUTPATIENT)
Age: 55
End: 2025-07-03

## 2025-08-07 ENCOUNTER — APPOINTMENT (OUTPATIENT)
Dept: ENDOCRINOLOGY | Facility: CLINIC | Age: 55
End: 2025-08-07
Payer: COMMERCIAL

## 2025-08-07 VITALS
WEIGHT: 146 LBS | HEART RATE: 82 BPM | OXYGEN SATURATION: 97 % | SYSTOLIC BLOOD PRESSURE: 123 MMHG | HEIGHT: 63 IN | DIASTOLIC BLOOD PRESSURE: 75 MMHG | TEMPERATURE: 98.1 F | RESPIRATION RATE: 16 BRPM | BODY MASS INDEX: 25.87 KG/M2

## 2025-08-07 DIAGNOSIS — M81.0 AGE-RELATED OSTEOPOROSIS W/OUT CURRENT PATHOLOGICAL FRACTURE: ICD-10-CM

## 2025-08-07 DIAGNOSIS — E03.9 HYPOTHYROIDISM, UNSPECIFIED: ICD-10-CM

## 2025-08-07 DIAGNOSIS — D35.2 BENIGN NEOPLASM OF PITUITARY GLAND: ICD-10-CM

## 2025-08-07 PROCEDURE — 99214 OFFICE O/P EST MOD 30 MIN: CPT

## 2025-08-07 PROCEDURE — G2211 COMPLEX E/M VISIT ADD ON: CPT | Mod: NC
